# Patient Record
Sex: MALE | Race: BLACK OR AFRICAN AMERICAN | Employment: OTHER | URBAN - METROPOLITAN AREA
[De-identification: names, ages, dates, MRNs, and addresses within clinical notes are randomized per-mention and may not be internally consistent; named-entity substitution may affect disease eponyms.]

---

## 2021-09-04 ENCOUNTER — APPOINTMENT (OUTPATIENT)
Dept: GENERAL RADIOLOGY | Facility: HOSPITAL | Age: 46
End: 2021-09-04
Attending: EMERGENCY MEDICINE
Payer: MEDICAID

## 2021-09-04 ENCOUNTER — HOSPITAL ENCOUNTER (EMERGENCY)
Facility: HOSPITAL | Age: 46
Discharge: HOME | End: 2021-09-04
Attending: EMERGENCY MEDICINE
Payer: MEDICAID

## 2021-09-04 VITALS
TEMPERATURE: 97 F | SYSTOLIC BLOOD PRESSURE: 134 MMHG | HEIGHT: 68 IN | DIASTOLIC BLOOD PRESSURE: 71 MMHG | HEART RATE: 64 BPM | OXYGEN SATURATION: 98 % | RESPIRATION RATE: 18 BRPM | BODY MASS INDEX: 28.19 KG/M2 | WEIGHT: 186 LBS

## 2021-09-04 DIAGNOSIS — S61.209A FINGER AVULSION, INITIAL ENCOUNTER: Primary | ICD-10-CM

## 2021-09-04 PROCEDURE — 73140 X-RAY EXAM OF FINGER(S): CPT | Performed by: EMERGENCY MEDICINE

## 2021-09-04 PROCEDURE — 99283 EMERGENCY DEPT VISIT LOW MDM: CPT

## 2021-09-04 PROCEDURE — 90471 IMMUNIZATION ADMIN: CPT

## 2021-09-04 RX ORDER — CEPHALEXIN 500 MG/1
500 CAPSULE ORAL 2 TIMES DAILY
Qty: 14 CAPSULE | Refills: 0 | Status: SHIPPED | OUTPATIENT
Start: 2021-09-04 | End: 2021-09-11

## 2021-09-04 RX ORDER — CEPHALEXIN 500 MG/1
500 CAPSULE ORAL ONCE
Status: COMPLETED | OUTPATIENT
Start: 2021-09-04 | End: 2021-09-04

## 2021-09-04 NOTE — ED PROVIDER NOTES
Patient Seen in: [facility] AND [facility] Emergency Department    History   Patient presents with:  Laceration/Abrasion    Stated Complaint: lac to right hand middle digit      HPI    40-year-old right-hand-dominant male presenting for evaluation of right distal tenderness. Neurological: Alert. Right third finger with full/intact ROM to MCP/IP joints. Skin: Skin is warm. Psychiatric: Cooperative. Nursing note and vitals reviewed.         ED Course   Labs Reviewed - No data to display      XR FINGER(S) (MIN 2 V followup and re-evaluation. Medications Prescribed:  Current Discharge Medication List    START taking these medications    mupirocin 2 % External Ointment  Apply 1 Application topically 3 (three) times daily for 14 days.   Qty: 30 g Refills: 0    ceph

## 2021-09-04 NOTE — ED INITIAL ASSESSMENT (HPI)
Patient presents to ER with c/o laceration to right middle finger from chainsaw - this occurred two days ago while at work,